# Patient Record
Sex: MALE | Race: WHITE | NOT HISPANIC OR LATINO
[De-identification: names, ages, dates, MRNs, and addresses within clinical notes are randomized per-mention and may not be internally consistent; named-entity substitution may affect disease eponyms.]

---

## 2019-01-16 ENCOUNTER — RECORD ABSTRACTING (OUTPATIENT)
Age: 54
End: 2019-01-16

## 2019-01-16 DIAGNOSIS — M47.814 SPONDYLOSIS W/OUT MYELOPATHY OR RADICULOPATHY, THORACIC REGION: ICD-10-CM

## 2019-02-01 ENCOUNTER — TRANSCRIPTION ENCOUNTER (OUTPATIENT)
Age: 54
End: 2019-02-01

## 2019-02-01 ENCOUNTER — APPOINTMENT (OUTPATIENT)
Dept: FAMILY MEDICINE | Facility: CLINIC | Age: 54
End: 2019-02-01
Payer: COMMERCIAL

## 2019-02-01 VITALS
HEIGHT: 73 IN | BODY MASS INDEX: 27.83 KG/M2 | WEIGHT: 210 LBS | DIASTOLIC BLOOD PRESSURE: 80 MMHG | SYSTOLIC BLOOD PRESSURE: 140 MMHG

## 2019-02-01 DIAGNOSIS — H10.10 ACUTE ATOPIC CONJUNCTIVITIS, UNSPECIFIED EYE: ICD-10-CM

## 2019-02-01 DIAGNOSIS — Z86.59 PERSONAL HISTORY OF OTHER MENTAL AND BEHAVIORAL DISORDERS: ICD-10-CM

## 2019-02-01 DIAGNOSIS — Z87.39 PERSONAL HISTORY OF OTHER DISEASES OF THE MUSCULOSKELETAL SYSTEM AND CONNECTIVE TISSUE: ICD-10-CM

## 2019-02-01 DIAGNOSIS — Z87.891 PERSONAL HISTORY OF NICOTINE DEPENDENCE: ICD-10-CM

## 2019-02-01 LAB
BILIRUB UR QL STRIP: NORMAL
CLARITY UR: CLEAR
COLLECTION METHOD: NORMAL
GLUCOSE UR-MCNC: NORMAL
HCG UR QL: 0.2 EU/DL
HGB UR QL STRIP.AUTO: NORMAL
KETONES UR-MCNC: NORMAL
LEUKOCYTE ESTERASE UR QL STRIP: NORMAL
NITRITE UR QL STRIP: NORMAL
PH UR STRIP: 5
PROT UR STRIP-MCNC: NORMAL
SP GR UR STRIP: 1.01

## 2019-02-01 PROCEDURE — 81003 URINALYSIS AUTO W/O SCOPE: CPT | Mod: QW

## 2019-02-01 PROCEDURE — 99396 PREV VISIT EST AGE 40-64: CPT | Mod: 25

## 2019-02-01 PROCEDURE — 36415 COLL VENOUS BLD VENIPUNCTURE: CPT

## 2019-02-01 NOTE — HEALTH RISK ASSESSMENT
[Very Good] : ~his/her~  mood as very good [No falls in past year] : Patient reported no falls in the past year [0] : 2) Feeling down, depressed, or hopeless: Not at all (0) [FreeTextEntry1] : STD testing. No symptoms but new partner [] : No [de-identified] : Quit 2017 [de-identified] : Quit 2017 [[id number]] : 0 [HIV Test offered] : HIV Test offered [Hepatitis C test offered] : Hepatitis C test offered [Change in mental status noted] : No change in mental status noted [Language] : denies difficulty with language [None] : None [Alone] : lives alone [Sexually Active] : sexually active [High Risk Behavior] : no high risk behavior [Feels Safe at Home] : Feels safe at home [Fully functional (bathing, dressing, toileting, transferring, walking, feeding)] : Fully functional (bathing, dressing, toileting, transferring, walking, feeding) [Fully functional (using the telephone, shopping, preparing meals, housekeeping, doing laundry, using] : Fully functional and needs no help or supervision to perform IADLs (using the telephone, shopping, preparing meals, housekeeping, doing laundry, using transportation, managing medications and managing finances) [Reports changes in hearing] : Reports no changes in hearing [Reports changes in vision] : Reports no changes in vision [Reports changes in dental health] : Reports no changes in dental health [ColonoscopyDate] : to schedule [HIVComments] : today [HepatitisCComments] : today [de-identified] : Self-employed [FreeTextEntry2] :  [de-identified] : "usually" uses condom. 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

## 2019-02-01 NOTE — PHYSICAL EXAM
[No Acute Distress] : no acute distress [Well Nourished] : well nourished [Well Developed] : well developed [PERRL] : pupils equal round and reactive to light [EOMI] : extraocular movements intact [Normal Oropharynx] : the oropharynx was normal [No JVD] : no jugular venous distention [Supple] : supple [Clear to Auscultation] : lungs were clear to auscultation bilaterally [Normal S1, S2] : normal S1 and S2 [No Carotid Bruits] : no carotid bruits [Pedal Pulses Present] : the pedal pulses are present [Normal Appearance] : normal in appearance [Soft] : abdomen soft [Non Tender] : non-tender [No HSM] : no HSM [Normal Sphincter Tone] : normal sphincter tone [No Mass] : no mass [Testes Mass (___cm)] : there were no testicular masses [Prostate Enlargement] : the prostate was not enlarged [Normal Anterior Cervical Nodes] : no anterior cervical lymphadenopathy [No CVA Tenderness] : no CVA  tenderness [No Joint Swelling] : no joint swelling [No Rash] : no rash [Normal Gait] : normal gait [No Focal Deficits] : no focal deficits [Normal Affect] : the affect was normal [Normal Insight/Judgement] : insight and judgment were intact

## 2019-02-01 NOTE — REVIEW OF SYSTEMS
[Fever] : no fever [Chills] : no chills [Fatigue] : no fatigue [Recent Change In Weight] : ~T no recent weight change [Vision Problems] : no vision problems [Sore Throat] : no sore throat [Chest Pain] : no chest pain [Palpitations] : no palpitations [Lower Ext Edema] : no lower extremity edema [Shortness Of Breath] : no shortness of breath [Cough] : no cough [Dyspnea on Exertion] : not dyspnea on exertion [Abdominal Pain] : no abdominal pain [Constipation] : no constipation [Heartburn] : no heartburn [Dysuria] : no dysuria [Nocturia] : nocturia [Joint Pain] : no joint pain [Back Pain] : no back pain [Itching] : no itching [Skin Rash] : no skin rash [Headache] : no headache [Dizziness] : no dizziness [Unsteady Walk] : no ataxia [Suicidal] : not suicidal [Insomnia] : no insomnia [Anxiety] : no anxiety [Depression] : no depression [Easy Bleeding] : no easy bleeding [Easy Bruising] : no easy bruising

## 2019-02-01 NOTE — HISTORY OF PRESENT ILLNESS
[FreeTextEntry1] : "I am feeling well" [de-identified] : 53 year old male presents for a scheduled CPE.\par The patient is fasting.  \par There have been no interim hospitalizations, ER visits, or significant injuries or illnesses.\par \par The patient has not been in therapy nor taking psych meds for over 2 years.\par He has no complaints\par

## 2019-02-02 LAB
ALBUMIN SERPL ELPH-MCNC: 4.7 G/DL
ALP BLD-CCNC: 111 U/L
ALT SERPL-CCNC: 27 U/L
ANION GAP SERPL CALC-SCNC: 13 MMOL/L
AST SERPL-CCNC: 27 U/L
BASOPHILS # BLD AUTO: 0.04 K/UL
BASOPHILS NFR BLD AUTO: 0.7 %
BILIRUB SERPL-MCNC: 0.5 MG/DL
BUN SERPL-MCNC: 17 MG/DL
CALCIUM SERPL-MCNC: 9.8 MG/DL
CHLORIDE SERPL-SCNC: 101 MMOL/L
CHOLEST SERPL-MCNC: 190 MG/DL
CHOLEST/HDLC SERPL: 3.8 RATIO
CO2 SERPL-SCNC: 26 MMOL/L
CREAT SERPL-MCNC: 0.83 MG/DL
EOSINOPHIL # BLD AUTO: 0.18 K/UL
EOSINOPHIL NFR BLD AUTO: 2.9 %
GLUCOSE SERPL-MCNC: 92 MG/DL
HCT VFR BLD CALC: 46.9 %
HDLC SERPL-MCNC: 50 MG/DL
HGB BLD-MCNC: 15.5 G/DL
HIV1+2 AB SPEC QL IA.RAPID: NONREACTIVE
HSV 1+2 IGG SER IA-IMP: NEGATIVE
HSV 1+2 IGG SER IA-IMP: NEGATIVE
HSV1 IGG SER QL: 0.81 INDEX
HSV2 IGG SER QL: 0.14 INDEX
IMM GRANULOCYTES NFR BLD AUTO: 0.3 %
LDLC SERPL CALC-MCNC: 111 MG/DL
LYMPHOCYTES # BLD AUTO: 2.23 K/UL
LYMPHOCYTES NFR BLD AUTO: 36.4 %
MAN DIFF?: NORMAL
MCHC RBC-ENTMCNC: 30.3 PG
MCHC RBC-ENTMCNC: 33 GM/DL
MCV RBC AUTO: 91.6 FL
MONOCYTES # BLD AUTO: 0.72 K/UL
MONOCYTES NFR BLD AUTO: 11.7 %
NEUTROPHILS # BLD AUTO: 2.94 K/UL
NEUTROPHILS NFR BLD AUTO: 48 %
PLATELET # BLD AUTO: 212 K/UL
POTASSIUM SERPL-SCNC: 4.8 MMOL/L
PROT SERPL-MCNC: 7.2 G/DL
RBC # BLD: 5.12 M/UL
RBC # FLD: 13.2 %
SODIUM SERPL-SCNC: 140 MMOL/L
TRIGL SERPL-MCNC: 147 MG/DL
WBC # FLD AUTO: 6.13 K/UL

## 2019-02-04 LAB
C TRACH RRNA SPEC QL NAA+PROBE: NOT DETECTED
HBV SURFACE AG SER QL: NONREACTIVE
HCV AB SER QL: NONREACTIVE
HCV S/CO RATIO: 0.1 S/CO
HSV1 IGM SER QL: NORMAL TITER
HSV2 AB FLD-ACNC: NORMAL TITER
N GONORRHOEA RRNA SPEC QL NAA+PROBE: NOT DETECTED
SOURCE AMPLIFICATION: NORMAL

## 2019-03-11 ENCOUNTER — TRANSCRIPTION ENCOUNTER (OUTPATIENT)
Age: 54
End: 2019-03-11

## 2019-04-04 ENCOUNTER — TRANSCRIPTION ENCOUNTER (OUTPATIENT)
Age: 54
End: 2019-04-04

## 2019-07-09 ENCOUNTER — APPOINTMENT (OUTPATIENT)
Dept: GASTROENTEROLOGY | Facility: CLINIC | Age: 54
End: 2019-07-09
Payer: MEDICAID

## 2019-07-09 VITALS
WEIGHT: 200 LBS | SYSTOLIC BLOOD PRESSURE: 129 MMHG | BODY MASS INDEX: 25.94 KG/M2 | HEART RATE: 72 BPM | DIASTOLIC BLOOD PRESSURE: 74 MMHG | HEIGHT: 73.5 IN

## 2019-07-09 PROCEDURE — 99202 OFFICE O/P NEW SF 15 MIN: CPT

## 2019-07-09 NOTE — ASSESSMENT
[FreeTextEntry1] : this gentleman, aged 53, is here for his first colonoscopy\par average risk, but we don’t know the full family history as he is adopted\par on no medications\par exercises vigorously as above\par \par AS WE OBTAIN INFORMED CONSENT FOR COLONOSCOPY, UPPER ENDOSCOPY [EGD], OR BOTH PROCEDURES TOGETHER;\par \par As with all procedures, there are risks of which the patient should be aware\par \par 1.  Anesthesia; deep sedation with Propofol;  there is a small risk of aspiration and pulmonary infection.  The Anesthesiologist meets with the patient the morning of the procedure to discuss in more detail\par \par 2.  risk of bleeding; from removal of a polyp, or rarely, from biopsies, 1 % or less\par \par 3.  risk of injury or perforation of the colon or upper GI tract; one in a thousand or less,  from removing a polyp or from advancing the instrument\par \par \par \par DR FIELD RECOMMENDATIONS FOR OPTIMAL BOWEL PREP\par \par 1. split the Miralax drink: half the night before, and half the morning of\par 2.  dulcolax 10 mg [2 5 mg tablets]  before each half of the drink\par 3.  diet for breakfast and lunch the day before prep; light solid food, easily chewable, easily digested\par 4.  begin the prep the night before sometime after five pm, one hour after dulcolax is taken\par 5.  the night before; goal is to have loose bowel movements; take one to three extra doses of Miralax, either a 17 gram packet, or a scoopful of the powder, if needed to achieve loose stools\par 6.  the goal the morning of is to have clear liquid stools; otherwise, after the second half of the prep, you should take one to three extra doses of Miralax [see above]\par 7.  remember; the extra doses are only if you are not achieving good results; and the well prepped colon allows a more complete exam\par \par AS WE OBTAIN INFORMED CONSENT FOR COLONOSCOPY, UPPER ENDOSCOPY [EGD], OR BOTH PROCEDURES TOGETHER;\par \par As with all procedures, there are risks of which the patient should be aware\par \par 1.  Anesthesia; deep sedation with Propofol;  there is a small risk of aspiration and pulmonary infection.  The Anesthesiologist meets with the patient the morning of the procedure to discuss in more detail\par \par 2.  risk of bleeding; from removal of a polyp, or rarely, from biopsies, 1 % or less\par \par 3.  risk of injury or perforation of the colon or upper GI tract; one in a thousand or less,  from removing a polyp or from advancing the instrument\par \par \par

## 2019-07-09 NOTE — REVIEW OF SYSTEMS
[Heart Rate Is Slow] : the heart rate was not slow [Heart Rate Is Fast] : the heart rate was not fast [Chest Pain] : no chest pain [Palpitations] : no palpitations [Negative] : Constitutional [FreeTextEntry5] : does triatholons

## 2019-07-09 NOTE — HISTORY OF PRESENT ILLNESS
[FreeTextEntry1] : 1. here for first colonoscopy\par patient is assymptomatic, normal bowel function, \par adopted; does not know of family history\par has external hemorrhoids or tags\par \par no meds\par dr Diggs s history reviewed.\par \par hx of depression; had used alcohol, none since 2012\par no smoking.\par \par

## 2019-07-17 ENCOUNTER — RESULT REVIEW (OUTPATIENT)
Age: 54
End: 2019-07-17

## 2019-07-18 ENCOUNTER — APPOINTMENT (OUTPATIENT)
Dept: GASTROENTEROLOGY | Facility: HOSPITAL | Age: 54
End: 2019-07-18

## 2019-07-22 DIAGNOSIS — D12.6 BENIGN NEOPLASM OF COLON, UNSPECIFIED: ICD-10-CM

## 2020-10-09 ENCOUNTER — APPOINTMENT (OUTPATIENT)
Dept: FAMILY MEDICINE | Facility: CLINIC | Age: 55
End: 2020-10-09
Payer: MEDICAID

## 2020-10-09 VITALS
WEIGHT: 211 LBS | SYSTOLIC BLOOD PRESSURE: 130 MMHG | HEIGHT: 73 IN | BODY MASS INDEX: 27.96 KG/M2 | OXYGEN SATURATION: 99 % | TEMPERATURE: 96.5 F | HEART RATE: 51 BPM | DIASTOLIC BLOOD PRESSURE: 80 MMHG

## 2020-10-09 DIAGNOSIS — F10.21 ALCOHOL DEPENDENCE, IN REMISSION: ICD-10-CM

## 2020-10-09 DIAGNOSIS — F31.9 BIPOLAR DISORDER, UNSPECIFIED: ICD-10-CM

## 2020-10-09 DIAGNOSIS — Z23 ENCOUNTER FOR IMMUNIZATION: ICD-10-CM

## 2020-10-09 PROCEDURE — 93000 ELECTROCARDIOGRAM COMPLETE: CPT

## 2020-10-09 PROCEDURE — 90471 IMMUNIZATION ADMIN: CPT

## 2020-10-09 PROCEDURE — 36415 COLL VENOUS BLD VENIPUNCTURE: CPT

## 2020-10-09 PROCEDURE — 99396 PREV VISIT EST AGE 40-64: CPT | Mod: 25

## 2020-10-09 PROCEDURE — 90715 TDAP VACCINE 7 YRS/> IM: CPT

## 2020-10-09 NOTE — PHYSICAL EXAM
[Normal Outer Ear/Nose] : the outer ears and nose were normal in appearance [Normal TMs] : both tympanic membranes were normal [Normal] : affect was normal and insight and judgment were intact

## 2020-10-09 NOTE — ASSESSMENT
[FreeTextEntry1] : patient with appropriate preventative UTD \par no concerns on exam \par age appropriate counseling given\par Fairly healthy 55-year-old male presenting for annual exam.  Bipolar disorder is well controlled and questionable given history of alcoholism have discussed with patient avoidance of stimulants and drugs which he is currently doing with 8 years sobriety.\par Counseled the patient on weight loss given overweight status BMI\par Counseled the patient on sexual activity including safe sex practices\par bradycardia likely secondary to the patient's excellent exercise regimen\par Patient has declined all vaccinations despite counseling and recommendations at this time

## 2020-10-09 NOTE — HEALTH RISK ASSESSMENT
[No] : No [0] : 2) Feeling down, depressed, or hopeless: Not at all (0) [Patient reported colonoscopy was normal] : Patient reported colonoscopy was normal [HIV Test offered] : HIV Test offered [Hepatitis C test declined] : Hepatitis C test declined [Alone] : lives alone [Employed] : employed [Single] : single [Sexually Active] : sexually active [] : No [de-identified] : 8 yrs  [de-identified] : regular exercise triathlons  [de-identified] : varied diet  [[id number]] : 0 [High Risk Behavior] : no high risk behavior [Reports changes in vision] : Reports no changes in vision [Reports changes in dental health] : Reports no changes in dental health [ColonoscopyDate] : 10/2019 [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

## 2020-10-09 NOTE — HISTORY OF PRESENT ILLNESS
[de-identified] : 55-year-old male with a history of bipolar disorder, alcoholism 8 years sober presenting today for annual exam\par Has regular follow up with dentist and vision\par \par Patient notes sexual activity inconsistent condom use for the last year with 2-3 partners\par

## 2020-10-12 LAB
C TRACH RRNA SPEC QL NAA+PROBE: NOT DETECTED
CHOLEST SERPL-MCNC: 190 MG/DL
CHOLEST/HDLC SERPL: 3.2 RATIO
HBV SURFACE AB SER QL: NONREACTIVE
HBV SURFACE AG SER QL: NONREACTIVE
HDLC SERPL-MCNC: 60 MG/DL
HIV1+2 AB SPEC QL IA.RAPID: NONREACTIVE
LDLC SERPL CALC-MCNC: 111 MG/DL
N GONORRHOEA RRNA SPEC QL NAA+PROBE: NOT DETECTED
SOURCE AMPLIFICATION: NORMAL
T PALLIDUM AB SER QL IA: NEGATIVE
TRIGL SERPL-MCNC: 93 MG/DL

## 2022-11-11 ENCOUNTER — APPOINTMENT (OUTPATIENT)
Dept: FAMILY MEDICINE | Facility: CLINIC | Age: 57
End: 2022-11-11

## 2022-11-11 VITALS
WEIGHT: 228 LBS | SYSTOLIC BLOOD PRESSURE: 130 MMHG | BODY MASS INDEX: 30.22 KG/M2 | DIASTOLIC BLOOD PRESSURE: 84 MMHG | OXYGEN SATURATION: 97 % | HEIGHT: 73 IN | TEMPERATURE: 97.6 F | HEART RATE: 71 BPM

## 2022-11-11 DIAGNOSIS — Z72.51 HIGH RISK HETEROSEXUAL BEHAVIOR: ICD-10-CM

## 2022-11-11 DIAGNOSIS — Z00.00 ENCOUNTER FOR GENERAL ADULT MEDICAL EXAMINATION W/OUT ABNORMAL FINDINGS: ICD-10-CM

## 2022-11-11 DIAGNOSIS — Z87.898 PERSONAL HISTORY OF OTHER SPECIFIED CONDITIONS: ICD-10-CM

## 2022-11-11 DIAGNOSIS — Z12.5 ENCOUNTER FOR SCREENING FOR MALIGNANT NEOPLASM OF PROSTATE: ICD-10-CM

## 2022-11-11 PROCEDURE — 36415 COLL VENOUS BLD VENIPUNCTURE: CPT

## 2022-11-11 PROCEDURE — 99396 PREV VISIT EST AGE 40-64: CPT | Mod: 25

## 2022-11-11 NOTE — ASSESSMENT
[FreeTextEntry1] : patient with appropriate preventative UTD \par no concerns on exam \par age appropriate counseling given

## 2022-11-11 NOTE — HEALTH RISK ASSESSMENT
[Never] : Never [No] : In the past 12 months have you used drugs other than those required for medical reasons? No [0] : 2) Feeling down, depressed, or hopeless: Not at all (0) [PHQ-2 Negative - No further assessment needed] : PHQ-2 Negative - No further assessment needed [Patient reported colonoscopy was normal] : Patient reported colonoscopy was normal [HIV Test offered] : HIV Test offered [Hepatitis C test declined] : Hepatitis C test declined [Alone] : lives alone [Employed] : employed [Single] : single [Sexually Active] : sexually active [de-identified] : regular activity  [[id number]] : 0 [ColonoscopyDate] : 2019 [ColonoscopyComments] : 2024 [FreeTextEntry2] :

## 2022-11-11 NOTE — HISTORY OF PRESENT ILLNESS
[de-identified] : 57 yr old male presents today for annual exam\par \par had covid in 9/2022-recovered without complication\par did a half [place] man in [place] a few weeks ago - last place - 8 1/2 hour race \par

## 2022-11-14 LAB
ALBUMIN SERPL ELPH-MCNC: 4.4 G/DL
ALP BLD-CCNC: 118 U/L
ALT SERPL-CCNC: 28 U/L
ANION GAP SERPL CALC-SCNC: 15 MMOL/L
AST SERPL-CCNC: 25 U/L
BASOPHILS # BLD AUTO: 0.08 K/UL
BASOPHILS NFR BLD AUTO: 0.8 %
BILIRUB SERPL-MCNC: 0.3 MG/DL
BUN SERPL-MCNC: 22 MG/DL
C TRACH RRNA SPEC QL NAA+PROBE: NOT DETECTED
CALCIUM SERPL-MCNC: 9.4 MG/DL
CHLORIDE SERPL-SCNC: 102 MMOL/L
CHOLEST SERPL-MCNC: 230 MG/DL
CO2 SERPL-SCNC: 21 MMOL/L
CREAT SERPL-MCNC: 0.72 MG/DL
EGFR: 107 ML/MIN/1.73M2
EOSINOPHIL # BLD AUTO: 0.41 K/UL
EOSINOPHIL NFR BLD AUTO: 4.1 %
GLUCOSE SERPL-MCNC: 97 MG/DL
HCT VFR BLD CALC: 46.6 %
HDLC SERPL-MCNC: 49 MG/DL
HGB BLD-MCNC: 15.8 G/DL
HIV1+2 AB SPEC QL IA.RAPID: NONREACTIVE
IMM GRANULOCYTES NFR BLD AUTO: 0.4 %
LDLC SERPL CALC-MCNC: 133 MG/DL
LYMPHOCYTES # BLD AUTO: 2.33 K/UL
LYMPHOCYTES NFR BLD AUTO: 23.5 %
MAN DIFF?: NORMAL
MCHC RBC-ENTMCNC: 29.8 PG
MCHC RBC-ENTMCNC: 33.9 GM/DL
MCV RBC AUTO: 87.9 FL
MONOCYTES # BLD AUTO: 0.96 K/UL
MONOCYTES NFR BLD AUTO: 9.7 %
N GONORRHOEA RRNA SPEC QL NAA+PROBE: NOT DETECTED
NEUTROPHILS # BLD AUTO: 6.09 K/UL
NEUTROPHILS NFR BLD AUTO: 61.5 %
NONHDLC SERPL-MCNC: 181 MG/DL
PLATELET # BLD AUTO: 237 K/UL
POTASSIUM SERPL-SCNC: 4.4 MMOL/L
PROT SERPL-MCNC: 7.1 G/DL
PSA FREE FLD-MCNC: 25 %
PSA FREE SERPL-MCNC: 0.27 NG/ML
PSA SERPL-MCNC: 1.09 NG/ML
RBC # BLD: 5.3 M/UL
RBC # FLD: 13.2 %
SODIUM SERPL-SCNC: 137 MMOL/L
SOURCE AMPLIFICATION: NORMAL
T PALLIDUM AB SER QL IA: NEGATIVE
TRIGL SERPL-MCNC: 240 MG/DL
WBC # FLD AUTO: 9.91 K/UL